# Patient Record
Sex: FEMALE | Race: BLACK OR AFRICAN AMERICAN | Employment: OTHER | URBAN - NONMETROPOLITAN AREA
[De-identification: names, ages, dates, MRNs, and addresses within clinical notes are randomized per-mention and may not be internally consistent; named-entity substitution may affect disease eponyms.]

---

## 2017-09-30 ENCOUNTER — APPOINTMENT (OUTPATIENT)
Dept: GENERAL RADIOLOGY | Age: 62
End: 2017-09-30
Payer: COMMERCIAL

## 2017-09-30 ENCOUNTER — HOSPITAL ENCOUNTER (EMERGENCY)
Age: 62
Discharge: HOME | End: 2017-10-01
Payer: COMMERCIAL

## 2017-09-30 VITALS
OXYGEN SATURATION: 99 % | BODY MASS INDEX: 51.91 KG/M2 | TEMPERATURE: 97.5 F | WEIGHT: 293 LBS | SYSTOLIC BLOOD PRESSURE: 134 MMHG | HEIGHT: 63 IN | DIASTOLIC BLOOD PRESSURE: 77 MMHG | HEART RATE: 92 BPM | RESPIRATION RATE: 19 BRPM

## 2017-09-30 DIAGNOSIS — M94.0 COSTOCHONDRITIS, ACUTE: Primary | ICD-10-CM

## 2017-09-30 LAB
ALBUMIN SERPL-MCNC: 3.6 G/DL (ref 3.5–5.1)
ALP BLD-CCNC: 70 U/L (ref 38–126)
ALT SERPL-CCNC: 16 U/L (ref 11–66)
ANION GAP SERPL CALCULATED.3IONS-SCNC: 13 MEQ/L (ref 8–16)
ANISOCYTOSIS: ABNORMAL
AST SERPL-CCNC: 21 U/L (ref 5–40)
BASOPHILS # BLD: 0.8 %
BASOPHILS ABSOLUTE: 0 THOU/MM3 (ref 0–0.1)
BILIRUB SERPL-MCNC: 0.4 MG/DL (ref 0.3–1.2)
BUN BLDV-MCNC: 15 MG/DL (ref 7–22)
C-REACTIVE PROTEIN: 0.71 MG/DL (ref 0–1)
CALCIUM SERPL-MCNC: 9.5 MG/DL (ref 8.5–10.5)
CHLORIDE BLD-SCNC: 102 MEQ/L (ref 98–111)
CO2: 24 MEQ/L (ref 23–33)
CREAT SERPL-MCNC: 0.8 MG/DL (ref 0.4–1.2)
EKG ATRIAL RATE: 89 BPM
EKG P AXIS: 56 DEGREES
EKG P-R INTERVAL: 176 MS
EKG Q-T INTERVAL: 396 MS
EKG QRS DURATION: 98 MS
EKG QTC CALCULATION (BAZETT): 481 MS
EKG R AXIS: 34 DEGREES
EKG T AXIS: 2 DEGREES
EKG VENTRICULAR RATE: 89 BPM
EOSINOPHIL # BLD: 0.9 %
EOSINOPHILS ABSOLUTE: 0 THOU/MM3 (ref 0–0.4)
GFR SERPL CREATININE-BSD FRML MDRD: 88 ML/MIN/1.73M2
GLUCOSE BLD-MCNC: 132 MG/DL (ref 70–108)
HCT VFR BLD CALC: 37.2 % (ref 37–47)
HEMOGLOBIN: 12 GM/DL (ref 12–16)
HYPOCHROMIA: ABNORMAL
LYMPHOCYTES # BLD: 42.8 %
LYMPHOCYTES ABSOLUTE: 1.8 THOU/MM3 (ref 1–4.8)
MCH RBC QN AUTO: 26.6 PG (ref 27–31)
MCHC RBC AUTO-ENTMCNC: 32.3 GM/DL (ref 33–37)
MCV RBC AUTO: 82.5 FL (ref 81–99)
MONOCYTES # BLD: 9.4 %
MONOCYTES ABSOLUTE: 0.4 THOU/MM3 (ref 0.4–1.3)
NUCLEATED RED BLOOD CELLS: 0 /100 WBC
OSMOLALITY CALCULATION: 280.2 MOSMOL/KG (ref 275–300)
PDW BLD-RTO: 16.1 % (ref 11.5–14.5)
PLATELET # BLD: 253 THOU/MM3 (ref 130–400)
PMV BLD AUTO: 8.8 MCM (ref 7.4–10.4)
POTASSIUM SERPL-SCNC: 3.7 MEQ/L (ref 3.5–5.2)
RBC # BLD: 4.51 MILL/MM3 (ref 4.2–5.4)
RBC # BLD: NORMAL 10*6/UL
SEDIMENTATION RATE, ERYTHROCYTE: 53 MM/HR (ref 0–20)
SEG NEUTROPHILS: 46.1 %
SEGMENTED NEUTROPHILS ABSOLUTE COUNT: 2 THOU/MM3 (ref 1.8–7.7)
SODIUM BLD-SCNC: 139 MEQ/L (ref 135–145)
TOTAL PROTEIN: 7.8 G/DL (ref 6.1–8)
TROPONIN T: < 0.01 NG/ML
WBC # BLD: 4.3 THOU/MM3 (ref 4.8–10.8)

## 2017-09-30 PROCEDURE — 93005 ELECTROCARDIOGRAM TRACING: CPT | Performed by: PHYSICIAN ASSISTANT

## 2017-09-30 PROCEDURE — 86140 C-REACTIVE PROTEIN: CPT

## 2017-09-30 PROCEDURE — 85025 COMPLETE CBC W/AUTO DIFF WBC: CPT

## 2017-09-30 PROCEDURE — 85651 RBC SED RATE NONAUTOMATED: CPT

## 2017-09-30 PROCEDURE — 80053 COMPREHEN METABOLIC PANEL: CPT

## 2017-09-30 PROCEDURE — 71020 XR CHEST STANDARD TWO VW: CPT

## 2017-09-30 PROCEDURE — 96372 THER/PROPH/DIAG INJ SC/IM: CPT

## 2017-09-30 PROCEDURE — 6360000002 HC RX W HCPCS: Performed by: PHYSICIAN ASSISTANT

## 2017-09-30 PROCEDURE — 99284 EMERGENCY DEPT VISIT MOD MDM: CPT

## 2017-09-30 PROCEDURE — 84484 ASSAY OF TROPONIN QUANT: CPT

## 2017-09-30 PROCEDURE — 36415 COLL VENOUS BLD VENIPUNCTURE: CPT

## 2017-09-30 RX ORDER — TRAMADOL HYDROCHLORIDE 50 MG/1
50 TABLET ORAL EVERY 6 HOURS PRN
Qty: 20 TABLET | Refills: 0 | Status: SHIPPED | OUTPATIENT
Start: 2017-09-30 | End: 2017-10-10

## 2017-09-30 RX ORDER — KETOROLAC TROMETHAMINE 30 MG/ML
60 INJECTION, SOLUTION INTRAMUSCULAR; INTRAVENOUS ONCE
Status: COMPLETED | OUTPATIENT
Start: 2017-09-30 | End: 2017-09-30

## 2017-09-30 RX ADMIN — KETOROLAC TROMETHAMINE 60 MG: 30 INJECTION, SOLUTION INTRAMUSCULAR at 21:23

## 2017-09-30 ASSESSMENT — PAIN DESCRIPTION - PAIN TYPE
TYPE: ACUTE PAIN

## 2017-09-30 ASSESSMENT — PAIN SCALES - GENERAL
PAINLEVEL_OUTOF10: 5
PAINLEVEL_OUTOF10: 7
PAINLEVEL_OUTOF10: 7

## 2017-09-30 ASSESSMENT — PAIN DESCRIPTION - FREQUENCY
FREQUENCY: CONTINUOUS
FREQUENCY: CONTINUOUS
FREQUENCY: INTERMITTENT

## 2017-09-30 ASSESSMENT — PAIN DESCRIPTION - LOCATION
LOCATION: OTHER (COMMENT)
LOCATION: RIB CAGE
LOCATION: RIB CAGE

## 2017-09-30 ASSESSMENT — PAIN DESCRIPTION - ORIENTATION
ORIENTATION: LEFT

## 2017-09-30 ASSESSMENT — PAIN DESCRIPTION - DESCRIPTORS: DESCRIPTORS: SHARP

## 2017-09-30 NOTE — ED AVS SNAPSHOT
· A summary of your hospital visit  · Follow-up appointments once you have left the hospital  · Your care plan at home      You may receive a survey regarding the care you received during your stay. Your input is valuable to us. We encourage you to complete and return your survey in the envelope provided. We hope you will choose us in the future for your healthcare needs. Patient Information     Patient Name THUAN Quintana 1955      Care Provided at:     Name Address Phone       [address] [phone]            Your Visit    Here you will find information about your visit, including the reason for your visit. Please take this sheet with you when you visit your doctor or other health care provider in the future. It will help determine the best possible medical care for you at that time. If you have any questions once you leave the hospital, please call the department phone number listed below. Diagnoses this visit     Your diagnosis was COSTOCHONDRITIS, ACUTE. Visit Information     Date of Visit Department Dept Phone    2017 [address] [facility] [address] [address] EMERGENCY DEPT [phone]      You were seen by     You were seen by CORRINA Collier. Follow-up Appointments    Below is a list of your follow-up and future appointments. This may not be a complete list as you may have made appointments directly with providers that we are not aware of or your providers may have made some for you. Please call your providers to confirm appointments. It is important to keep your appointments. Please bring your current insurance card, photo ID, co-pay, and all medication bottles to your appointment. If self-pay, payment is expected at the time of service. Follow-up Information     Follow up with Kartsen Martinez. MYNOR Pang In 3 days.     Specialty:  Nurse Practitioner    Contact information: [facility] [address]  [phone]        Preventive Care        Date Due    Hepatitis C screening is recommended for all adults regardless of risk factors born between [facility] at least once (lifetime) who have never been tested. 1955    HIV screening is recommended for all people regardless of risk factors  aged 15-65 years at least once (lifetime) who have never been HIV tested. 5/23/1972    Tetanus Combination Vaccine (1 - Tdap) 5/23/1976    Pap Smear 5/23/1978    Diabetes Screening 5/23/1997    Colonoscopy 5/23/2007    Cholesterol Screening 10/27/2016    Zoster Vaccine 5/23/2017    Yearly Flu Vaccine (1) 9/1/2017    Mammograms are recommended every 2 years for low/average risk patients aged 48 - 69, and every year for high risk patients per updated national guidelines. However these guidelines can be individualized by your provider. 4/3/2019                 Care Plan Once You Return Home    This section includes instructions you will need to follow once you leave the hospital.  Your care team will discuss these with you, so you and those caring for you know how to best care for your health needs at home. This section may also include educational information about certain health topics that may be of help to you. Important Information if you smoke or are exposed to smoking       SMOKING: QUIT SMOKING. THIS IS THE MOST IMPORTANT ACTION YOU CAN TAKE TO IMPROVE YOUR CURRENT AND FUTURE HEALTH. Call the On license of [facility] [place] NOW ([phone])    Smoking harms nonsmokers. When nonsmokers are around people who smoke, they absorb nicotine, carbon monoxide, and other ingredients of tobacco smoke. DO NOT SMOKE AROUND CHILDREN     Children exposed to secondhand smoke are at an increased risk of:  Sudden Infant Death Syndrome (SIDS), acute respiratory infections, inflammation of the middle ear, and severe asthma.   Over a longer time, it causes heart disease and lung cancer. There is no safe level of exposure to secondhand smoke. MyChart Signup     Our records indicate that you have an active [employer] account. You can view your After Visit Summary by going to [email or web address] and logging in with your [employer] username and password. If you don't have a [employer] username and password but a parent or guardian has access to your record, the parent or guardian should login with their own [employer] username and password and access your record to view the After Visit Summary. Additional Information  If you have questions, please contact the physician practice where you receive care. Remember, [employer] is NOT to be used for urgent needs. For medical emergencies, dial 911. For questions regarding your MyChart account call [phone]. If you have a clinical question, please call your doctor's office. View your information online  ? Review your current list of  medications, immunization, and allergies. ? Review your future test results online . ? Review your discharge instructions provided by your caregivers at discharge    Certain functionality such as prescription refills, scheduling appointments or sending messages to your provider are not activated if your provider does not use [employer] in his/her office    For questions regarding your MyChart account call [phone]. If you have a clinical question, please call your doctor's office. The information on all pages of the After Visit Summary has been reviewed with me, the patient and/or responsible adult, by my health care provider(s). I had the opportunity to ask questions regarding this information. I understand I should dispose of my armband safely at home to protect my health information. A complete copy of the After Visit Summary has been given to me, the patient and/or responsible adult.

## 2017-10-01 ASSESSMENT — ENCOUNTER SYMPTOMS
ABDOMINAL PAIN: 0
VOMITING: 0
COUGH: 0
BACK PAIN: 0
NAUSEA: 0
SORE THROAT: 0
WHEEZING: 0
SHORTNESS OF BREATH: 0
EYE PAIN: 0
RHINORRHEA: 0
EYE DISCHARGE: 0
DIARRHEA: 0

## 2017-10-01 NOTE — ED TRIAGE NOTES
Pt presents to ED complaining of L sided rib pain. Pt states her pain began suddenly earlier today and about 1 hour ago her pain became immensely worse. Pt states she could barely move so she called her son to bring her in. Pt denies taking anything for pain. Will monitor.

## 2017-10-01 NOTE — ED PROVIDER NOTES
[facility]  eMERGENCY dEPARTMENT eNCOUnter          CHIEF COMPLAINT       Chief Complaint   Patient presents with    Rib Pain       Nurses Notes reviewed and I agree except as noted in the HPI. HISTORY OF PRESENT ILLNESS    Lio Naqvi is a 63 y.o. female who presents to the Emergency Department for the evaluation of left-sided rib pain. Patient states that the pain began suddenly today and got worse while she was sitting down at roughly 8:00pm. She rates her pain at a 7/10 in severity, and states that the pain is sharp in quality. She states that the pain is continuous in duration, and that the pain gets worse with movement and when taking deep breaths. She states that the pain radiates to her back. She denies any injury, chest pain, shortness of breath, or abdominal pain. She reports a history of diabetes. Patient denies further complaints at initial encounter. Location/Symptom: Left-sided rib pain  Timing/Onset: Today, but worsened at roughly 8:00pm  Context/Setting: Patient states that the pain began suddenly and denies any injury. Quality: Sharp  Duration: Continuous  Modifying Factors: Movement and taking deep breaths worsen the pain. Severity: 7/10    The HPI was provided by the patient. REVIEW OF SYSTEMS     Review of Systems   Constitutional: Negative for appetite change, chills, fatigue and fever. HENT: Negative for congestion, ear pain, rhinorrhea and sore throat. Eyes: Negative for pain, discharge and visual disturbance. Respiratory: Negative for cough, shortness of breath and wheezing. Cardiovascular: Negative for chest pain, palpitations and leg swelling. Gastrointestinal: Negative for abdominal pain, diarrhea, nausea and vomiting. Genitourinary: Negative for difficulty urinating, dysuria and vaginal discharge. Musculoskeletal: Positive for arthralgias (left-sided rib pain). Negative for back pain, joint swelling and neck pain.    Skin: Negative for pallor and rash. Neurological: Negative for dizziness, syncope, weakness, light-headedness and headaches. Hematological: Negative for adenopathy. Psychiatric/Behavioral: Negative for confusion, dysphoric mood and suicidal ideas. The patient is not nervous/anxious. PAST MEDICAL HISTORY    has a past medical history of Anxiety; Brain tumor ([address] Diabetes mellitus ([facility] [address] Obesity; and Seizures ([facility] [address] SURGICAL HISTORY      has a past surgical history that includes  section; Appendectomy; and Brain tumor excision. CURRENT MEDICATIONS       Previous Medications    ATORVASTATIN (LIPITOR) 10 MG TABLET    Take 10 mg by mouth daily. HYDROCODONE-ACETAMINOPHEN (NORCO) 5-325 MG PER TABLET    Take 1 tablet by mouth every 4 hours as needed for Pain. METFORMIN (GLUCOPHAGE) 500 MG TABLET    Take 500 mg by mouth daily. TRIAMCINOLONE (KENALOG) 0.1 % CREAM    Apply topically 2 times daily. ALLERGIES     has No Known Allergies. FAMILY HISTORY     indicated that her mother is . She indicated that her father is . She indicated that her sister is . family history includes Cancer in her mother and sister; Stroke in her father. SOCIAL HISTORY      reports that she has never smoked. She does not have any smokeless tobacco history on file. She reports that she does not drink alcohol or use illicit drugs. PHYSICAL EXAM     INITIAL VITALS:  height is 5' 3\" (1.6 m) and weight is 349 lb (158.3 kg) (abnormal). Her oral temperature is 97.5 °F (36.4 °C). Her blood pressure is 134/77 and her pulse is 92. Her respiration is 19 and oxygen saturation is 99%. Physical Exam   Constitutional: She is oriented to person, place, and time. Vital signs are normal. She appears well-developed and well-nourished. She is active and cooperative. HENT:   Head: Normocephalic and atraumatic.    Right Ear: External ear normal.   Left Ear: External ear normal.   Eyes: Conjunctivae and EOM are normal. Right eye exhibits no discharge. Left eye exhibits no discharge. No scleral icterus. Neck: Trachea normal and normal range of motion. Neck supple. No JVD present. No tracheal deviation present. Cardiovascular: Normal rate, regular rhythm, normal heart sounds, intact distal pulses and normal pulses. Exam reveals no gallop and no friction rub. No murmur heard. Pulmonary/Chest: Effort normal and breath sounds normal. No accessory muscle usage. No respiratory distress. She has no decreased breath sounds. She has no wheezes. She has no rhonchi. She has no rales. She exhibits tenderness (left-sided). Patient exhibits left-sided anterior chest wall tenderness which radiates to her back and gets exacerbated with movement. Abdominal: Soft. She exhibits no distension. There is no tenderness. There is no rebound and no guarding. Musculoskeletal: Normal range of motion. She exhibits no edema. Neurological: She is alert and oriented to person, place, and time. No cranial nerve deficit or sensory deficit. She exhibits normal muscle tone. She displays no seizure activity. GCS eye subscore is 4. GCS verbal subscore is 5. GCS motor subscore is 6. Skin: Skin is warm and dry. No rash noted. She is not diaphoretic. Psychiatric: She has a normal mood and affect. Her speech is normal and behavior is normal. Thought content normal.   Nursing note and vitals reviewed. DIFFERENTIAL DIAGNOSIS:   Chest wall strain, costochondritis, left-sided rib fracture. DIAGNOSTIC RESULTS     EKG: All EKG's are interpreted by the Emergency Department Physician who either signs or Co-signs this chart in the absence of a cardiologist.    EKG read and interpreted by myself gives impression of normal sinus rhythm with heart rate of 89; interval 176; QRS 98;QTc 481; axis 56 34 2. No STEMI.      RADIOLOGY: non-plain film images(s) such as CT, Ultrasound and MRI are read by the radiologist.    XR CHEST STANDARD (2 VW)   Final Result    Stable radiographic appearance of the chest. No evidence of an acute process. **This report has been created using voice recognition software. It may contain minor errors which are inherent in voice recognition technology. **      Final report electronically signed by Dr. Earl Billingsley on 9/30/2017 10:51 PM          LABS:     Labs Reviewed   CBC WITH AUTO DIFFERENTIAL - Abnormal; Notable for the following:        Result Value    WBC 4.3 (*)     MCH 26.6 (*)     MCHC 32.3 (*)     RDW 16.1 (*)     All other components within normal limits   COMPREHENSIVE METABOLIC PANEL - Abnormal; Notable for the following:     Glucose 132 (*)     All other components within normal limits   SEDIMENTATION RATE - Abnormal; Notable for the following:     Sed Rate 53 (*)     All other components within normal limits   GLOMERULAR FILTRATION RATE, ESTIMATED - Abnormal; Notable for the following:     Est, Glom Filt Rate 88 (*)     All other components within normal limits   TROPONIN   C-REACTIVE PROTEIN   ANION GAP   OSMOLALITY       EMERGENCY DEPARTMENT COURSE:   Vitals:    Vitals:    09/30/17 2101 09/30/17 2238 09/30/17 2347   BP: (!) 156/90 137/78 134/77   Pulse: 94 85 92   Resp: 18 19 19   Temp: 97.5 °F (36.4 °C)     TempSrc: Oral     SpO2: 100% 100% 99%   Weight: (!) 349 lb (158.3 kg)     Height: 5' 3\" (1.6 m)         11:48 PM: The patient was seen and evaluated. MDM:  The patient was seen and evaluated within the ED today with left-sided rib pain. Within the department, I observed the patient's vital signs to be within acceptable range. On exam, I appreciated left-sided anterior chest wall tenderness which radiated to the patient's back. Radiological studies within the department revealed no acute findings. Laboratory work was reassuring. Within the department, the patient was treated with Toradol for pain. I observed the patient's condition to improve during the duration of their stay.  I explained my proposed course of treatment to the patient, who was amenable to my decision. They were discharged home in stable condition with prescriptions for Ultram, and the patient will return to the ED if their symptoms become more severe in nature or otherwise change. CRITICAL CARE:   None     CONSULTS:  None    PROCEDURES:  None    FINAL IMPRESSION      1. Costochondritis, acute          DISPOSITION/PLAN   Discharge    PATIENT REFERRED TO:  Cecil Mcpherson, CNP  [address]  [phone]    In 3 days        DISCHARGE MEDICATIONS:  New Prescriptions    TRAMADOL (ULTRAM) 50 MG TABLET    Take 1 tablet by mouth every 6 hours as needed for Pain       (Please note that portions of this note were completed with a voice recognition program.  Efforts were made to edit the dictations but occasionally words are mis-transcribed.)    The patient was given an opportunity to see the Emergency Attending. The patient voiced understanding that I was a Mid-Level Provider and was in agreement with being seen independently by myself. Scribe:  Anjana Rosario 9/30/17 11:48 PM Scribing for and in the presence of Janiya Hogan PA-C. Signed by: Max Kirkland, 09/30/17 11:48 PM    Provider:  I personally performed the services described in the documentation, reviewed and edited the documentation which was dictated to the scribe in my presence, and it accurately records my words and actions.     Janiya Hogan PA-C 9/30/17 11:48 PM       CORRINA Bell  10/01/17 [id number]

## 2019-06-07 ENCOUNTER — HOSPITAL ENCOUNTER (OUTPATIENT)
Dept: WOMENS IMAGING | Age: 64
Discharge: HOME | End: 2019-06-07
Payer: COMMERCIAL

## 2019-06-07 DIAGNOSIS — Z13.9 VISIT FOR SCREENING: ICD-10-CM

## 2019-06-07 PROCEDURE — 77063 BREAST TOMOSYNTHESIS BI: CPT

## 2021-05-19 ENCOUNTER — HOSPITAL ENCOUNTER (OUTPATIENT)
Dept: WOMENS IMAGING | Age: 66
Discharge: HOME | End: 2021-05-19
Payer: COMMERCIAL

## 2021-05-19 DIAGNOSIS — Z12.31 VISIT FOR SCREENING MAMMOGRAM: ICD-10-CM

## 2021-05-19 PROCEDURE — 77063 BREAST TOMOSYNTHESIS BI: CPT

## 2021-08-24 ENCOUNTER — HOSPITAL ENCOUNTER (EMERGENCY)
Age: 66
Discharge: HOME | End: 2021-08-24
Payer: COMMERCIAL

## 2021-08-24 VITALS
TEMPERATURE: 98.2 F | WEIGHT: 293 LBS | DIASTOLIC BLOOD PRESSURE: 98 MMHG | HEART RATE: 103 BPM | OXYGEN SATURATION: 100 % | SYSTOLIC BLOOD PRESSURE: 150 MMHG | HEIGHT: 63 IN | BODY MASS INDEX: 51.91 KG/M2 | RESPIRATION RATE: 18 BRPM

## 2021-08-24 DIAGNOSIS — T78.3XXA ANGIOEDEMA, INITIAL ENCOUNTER: Primary | ICD-10-CM

## 2021-08-24 PROCEDURE — 99282 EMERGENCY DEPT VISIT SF MDM: CPT

## 2021-08-24 RX ORDER — PREDNISONE 10 MG/1
TABLET ORAL
Qty: 20 TABLET | Refills: 0 | Status: SHIPPED | OUTPATIENT
Start: 2021-08-24 | End: 2021-09-03

## 2021-08-24 RX ORDER — FAMOTIDINE 20 MG/1
20 TABLET, FILM COATED ORAL 2 TIMES DAILY
Qty: 60 TABLET | Refills: 3 | Status: ON HOLD | OUTPATIENT
Start: 2021-08-24 | End: 2021-08-25 | Stop reason: ALTCHOICE

## 2021-08-24 RX ORDER — HYDROXYZINE 50 MG/1
50 TABLET, FILM COATED ORAL 3 TIMES DAILY PRN
Qty: 30 TABLET | Refills: 0 | Status: ON HOLD | OUTPATIENT
Start: 2021-08-24 | End: 2021-08-25 | Stop reason: ALTCHOICE

## 2021-08-24 NOTE — ED PROVIDER NOTES
Reason for Visit: Facial Swelling      HISTORY OF PRESENT ILLNESS       HPI: This 61 y.o. femalepresents to the emergency department for facial swelling. She reports that around 1:30 pm today while she was at [employer] her upper lip suddenly started to swell. She denies having any injury to the area. SHe denies hx of allergies. She denies using any new soaps, lotions, creams, or detergents. She denies being bit by an insect in the area or eating any new foods. She reports that the area is not painful. She denies throat swelling or difficulty breathing. Patient has never had anything like this happen to her before. Patient is currently on lisinopril. Past Medical History:   Diagnosis Date    Anxiety     [facility] ([facility] [address]     X2; benign    Diabetes mellitus ([facility] [address]     Obesity     Seizures ([facility] [address]        Past Surgical History:   Procedure Laterality Date    APPENDECTOMY      BRAIN TUMOR EXCISION      x 2     SECTION      x3       Mohit Fuentes   Social History     Socioeconomic History    Marital status:      Spouse name: Not on file    Number of children: Not on file    Years of education: Not on file    Highest education level: Not on file   Occupational History    Not on file   Tobacco Use    Smoking status: Never Smoker    Smokeless tobacco: Never Used   Vaping Use    Vaping Use: Never used   Substance and Sexual Activity    Alcohol use: Yes     Comment: occasion    Drug use: No    Sexual activity: Not on file   Other Topics Concern    Not on file   Social History Narrative    Not on file     Social Determinants of Health     Financial Resource Strain:     Difficulty of Paying Living Expenses:    Food Insecurity:     Worried About Running Out of Food in the Last Year:     [address] in the Last Year:    Transportation Needs:     Lack of Transportation (Medical):      Lack of Transportation (Non-Medical):    Physical Activity:     Days of Exercise per Week:     Minutes of Exercise per Session:    Stress:     Feeling of Stress :    Social Connections:     Frequency of Communication with Friends and Family:     Frequency of Social Gatherings with Friends and Family:     Attends Religion Services:     Active Member of Clubs or Organizations:     Attends Club or Organization Meetings:     Marital Status:    Intimate Partner Violence:     Fear of Current or Ex-Partner:     Emotionally Abused:     Physically Abused:     Sexually Abused:        Discharge Medication List as of 8/24/2021  3:37 PM      CONTINUE these medications which have NOT CHANGED    Details   atorvastatin (LIPITOR) 10 MG tablet Take 10 mg by mouth daily. Historical Med      HYDROcodone-acetaminophen (NORCO) 5-325 MG per tablet Take 1 tablet by mouth every 4 hours as needed for Pain., Disp-20 tablet, R-0Print      metformin (GLUCOPHAGE) 500 MG tablet Take 500 mg by mouth daily. Historical Med      triamcinolone (KENALOG) 0.1 % cream Apply topically 2 times daily. , Disp-45 g, R-0, Print             Allergies: Allergies as of 08/24/2021    (No Known Allergies)       Review of Systems       See HPI for further details. At least 10 systems reviewed and are otherwise negative unless noted in the history of present illness. Constitutional: Denies fever or chills   Eyes: Denies change in visual acuity   HENT: Upper lip swelling.  Denies nasal congestion or sore throat   Respiratory: Denies cough or shortness of breath   Cardiovascular: Denies chest pain or edema   GI: Denies abdominal pain, nausea, vomiting, bloody stools or diarrhea   Musculoskeletal: Denies back pain or joint pain   Integument: Denies rash   Neurologic: Denies headache, focal weakness or sensory changes    Psychiatric: Denies depression or anxiety       Physical Exam       Vitals:    08/24/21 1408   BP: (!) 150/98   Pulse: 103   Resp: 18   Temp: 98.2 °F (36.8 °C)   TempSrc: Oral   SpO2: 100%   Weight: (!) 378 lb (171.5 kg)   Height: 5' 3\" (1.6 m)       Constitutional: No acute distress, Non-toxic appearance; well nourished    HENT: Normocephalic, Atraumatic, Swelling noted to the upper lip that extends to the right side of the face. Bilateral external ears normal, Oropharynx moist, No swelling of the oropharynx noted. No oral exudates, Nose normal.    Eyes: PERRLA, EOMI, Conjunctiva normal, No discharge. Neck: Normal range of motion, No tenderness, Supple, No lymphadenopathy, No stridor. Cardiovascular: Normal heart rate, Normal rhythm, No murmurs, No rubs, No gallops. Pulmonary/Chest: Normal breath sounds, No respiratory distress, No wheezing, No chest tenderness    Abdomen: Bowel sounds normal, Soft, No tenderness, No masses, No pulsatile masses    Extremities: Normal range of motion, No edema, Patient is able to walk bear weight. No ecchymosis or erythema. Neurologic: Alert & oriented x 3,     Skin: Warm, Dry, No erythema, No rash    Psychiatric: Alert and oriented to person, place, and time. Patient maintains good eye contact. Mood and affect were normal. Concentration appeared normal      Diagnostic Studies       Please see electronic medical record for any tests performed in the ED. Labs:    Labs Reviewed - No data to display    Radiology:    No orders to display       Emergency Department Procedures         ED Course and MDM       Em Pickering is a 61 y.o. female who presented to the emergency department with a chief complaint of facial swelling. Patient was seen, history and physical exam was performed. Patient remains stable here in the emergency department. Patient's physical examination findings were reviewed and were concerning for angioedema. No tongue swelling no difficulty swallowing. No shortness of breath no headache. Angioedema seems to be localized.     Labs Reviewed - No data to display  Medications - No data to display  Discharge Medication List as of 8/24/2021  3:37 PM      START taking these medications    Details   predniSONE (DELTASONE) 10 MG tablet Take 4 tablets by mouth once daily for 5 days, Disp-20 tablet, R-0Print      hydrOXYzine (ATARAX) 50 MG tablet Take 1 tablet by mouth 3 times daily as needed for Itching, Disp-30 tablet, R-0Print      famotidine (PEPCID) 20 MG tablet Take 1 tablet by mouth 2 times daily, Disp-60 tablet, R-3Print               Counseling     The emergency provider has spoken with the patient and discussed today's findings, in addition to providing specific details for the plan of care. Questions are answered and there is agreement with the plan. Patient was given clear discharge and followup instructions including return to the ER immediately for worsening concerns. Patient is advised to followup with primary care physician and/or referred physician in the next one to two days or sooner if worsening and to return to the ER immediately as above with any concerns. Usual and customary treatment and medication side effects and warning signs discussed. Patient was discharged from emergency department in good condition with all questions answered and patient has demonstrated capacity to understand these instructions and to follow up. Refer to the patient's discharge summary for more information on plan and follow-up. I have explained to the patient in appropriate terminology our work-up in the ED and their diagnosis. I have also given anticipatory guidance and expectant management of their condition as an outpatient as per my custom. They are instructed to return to the ED if their condition deteriorates in any way    Of note, this patient's blood pressure was elevated here in the department however they are asymptomatic at this time. Patient educated on how to check blood pressure at home and urged to closely follow up with a primary care provider for their blood pressure.      This patient was seen under the direct supervision of the attending physician who was available for consultation. Differential Diagnosis   Allergic reaction, angioedema, bite/sting    Consults: None     DECISION to ADMIT / DISCHARGE:     7:29 PM    Clinical Impression       1.   1. Angioedema, initial encounter        Disposition       All results were shared with the patient, medical decision making was discussed and all questions were answered, and she agreed to assessment and plan. Patient was DISCHARGED from the hospital. Based on the reassuring ED workup and patient's stable vital signs, I feel the patient may be safely discharged home. At this point in time, I believe the patient has the mental capacity to make medical decisions.           Liza Armendariz, [id number]  08/24/21 1929

## 2021-08-24 NOTE — ED TRIAGE NOTES
Patient presents to ER today due to facial swelling. She states it came on while she was at [facility], around 1pm today. Denies allergies, denies difficulty breathing. No new medications, soaps, laundry products.

## 2021-08-25 ENCOUNTER — HOSPITAL ENCOUNTER (OUTPATIENT)
Age: 66
Setting detail: OBSERVATION
Discharge: HOME | End: 2021-08-26
Attending: INTERNAL MEDICINE | Admitting: INTERNAL MEDICINE
Payer: COMMERCIAL

## 2021-08-25 DIAGNOSIS — T78.3XXA ANGIOEDEMA, INITIAL ENCOUNTER: Primary | ICD-10-CM

## 2021-08-25 LAB
ABO: NORMAL
ALBUMIN SERPL-MCNC: 4.2 G/DL (ref 3.5–5.1)
ALP BLD-CCNC: 93 U/L (ref 38–126)
ALT SERPL-CCNC: 15 U/L (ref 11–66)
ANION GAP SERPL CALCULATED.3IONS-SCNC: 11 MEQ/L (ref 8–16)
ANTIBODY SCREEN: NORMAL
AST SERPL-CCNC: 21 U/L (ref 5–40)
BASOPHILS # BLD: 0.4 %
BASOPHILS ABSOLUTE: 0 THOU/MM3 (ref 0–0.1)
BILIRUB SERPL-MCNC: 0.5 MG/DL (ref 0.3–1.2)
BUN BLDV-MCNC: 14 MG/DL (ref 7–22)
CALCIUM SERPL-MCNC: 9.8 MG/DL (ref 8.5–10.5)
CHLORIDE BLD-SCNC: 107 MEQ/L (ref 98–111)
CO2: 21 MEQ/L (ref 23–33)
CREAT SERPL-MCNC: 0.9 MG/DL (ref 0.4–1.2)
EKG ATRIAL RATE: 91 BPM
EKG P AXIS: 70 DEGREES
EKG P-R INTERVAL: 188 MS
EKG Q-T INTERVAL: 380 MS
EKG QRS DURATION: 98 MS
EKG QTC CALCULATION (BAZETT): 467 MS
EKG R AXIS: 61 DEGREES
EKG T AXIS: 28 DEGREES
EKG VENTRICULAR RATE: 91 BPM
EOSINOPHIL # BLD: 0.2 %
EOSINOPHILS ABSOLUTE: 0 THOU/MM3 (ref 0–0.4)
ERYTHROCYTE [DISTWIDTH] IN BLOOD BY AUTOMATED COUNT: 16.9 % (ref 11.5–14.5)
ERYTHROCYTE [DISTWIDTH] IN BLOOD BY AUTOMATED COUNT: 51.8 FL (ref 35–45)
GFR SERPL CREATININE-BSD FRML MDRD: 76 ML/MIN/1.73M2
GLUCOSE BLD-MCNC: 106 MG/DL (ref 70–108)
GLUCOSE BLD-MCNC: 127 MG/DL (ref 70–108)
HCT VFR BLD CALC: 38.1 % (ref 37–47)
HEMOGLOBIN: 12.3 GM/DL (ref 12–16)
IMMATURE GRANS (ABS): 0.04 THOU/MM3 (ref 0–0.07)
IMMATURE GRANULOCYTES: 0.9 %
LYMPHOCYTES # BLD: 35.6 %
LYMPHOCYTES ABSOLUTE: 1.6 THOU/MM3 (ref 1–4.8)
MCH RBC QN AUTO: 27.2 PG (ref 26–33)
MCHC RBC AUTO-ENTMCNC: 32.3 GM/DL (ref 32.2–35.5)
MCV RBC AUTO: 84.1 FL (ref 81–99)
MONOCYTES # BLD: 6.6 %
MONOCYTES ABSOLUTE: 0.3 THOU/MM3 (ref 0.4–1.3)
NUCLEATED RED BLOOD CELLS: 0 /100 WBC
OSMOLALITY CALCULATION: 278.4 MOSMOL/KG (ref 275–300)
PLATELET # BLD: 249 THOU/MM3 (ref 130–400)
PMV BLD AUTO: 10.5 FL (ref 9.4–12.4)
POTASSIUM REFLEX MAGNESIUM: 4.3 MEQ/L (ref 3.5–5.2)
PROCALCITONIN: 0.04 NG/ML (ref 0.01–0.09)
RBC # BLD: 4.53 MILL/MM3 (ref 4.2–5.4)
RH FACTOR: NORMAL
SEG NEUTROPHILS: 56.3 %
SEGMENTED NEUTROPHILS ABSOLUTE COUNT: 2.6 THOU/MM3 (ref 1.8–7.7)
SODIUM BLD-SCNC: 139 MEQ/L (ref 135–145)
TOTAL PROTEIN: 8.3 G/DL (ref 6.1–8)
WBC # BLD: 4.6 THOU/MM3 (ref 4.8–10.8)

## 2021-08-25 PROCEDURE — 84145 PROCALCITONIN (PCT): CPT

## 2021-08-25 PROCEDURE — 36415 COLL VENOUS BLD VENIPUNCTURE: CPT

## 2021-08-25 PROCEDURE — 86901 BLOOD TYPING SEROLOGIC RH(D): CPT

## 2021-08-25 PROCEDURE — 96374 THER/PROPH/DIAG INJ IV PUSH: CPT

## 2021-08-25 PROCEDURE — 85025 COMPLETE CBC W/AUTO DIFF WBC: CPT

## 2021-08-25 PROCEDURE — 96376 TX/PRO/DX INJ SAME DRUG ADON: CPT

## 2021-08-25 PROCEDURE — P9017 PLASMA 1 DONOR FRZ W/IN 8 HR: HCPCS

## 2021-08-25 PROCEDURE — 6360000002 HC RX W HCPCS

## 2021-08-25 PROCEDURE — 6370000000 HC RX 637 (ALT 250 FOR IP)

## 2021-08-25 PROCEDURE — 96375 TX/PRO/DX INJ NEW DRUG ADDON: CPT

## 2021-08-25 PROCEDURE — G0378 HOSPITAL OBSERVATION PER HR: HCPCS

## 2021-08-25 PROCEDURE — 82948 REAGENT STRIP/BLOOD GLUCOSE: CPT

## 2021-08-25 PROCEDURE — 96365 THER/PROPH/DIAG IV INF INIT: CPT

## 2021-08-25 PROCEDURE — 2580000003 HC RX 258

## 2021-08-25 PROCEDURE — 86850 RBC ANTIBODY SCREEN: CPT

## 2021-08-25 PROCEDURE — 93005 ELECTROCARDIOGRAM TRACING: CPT

## 2021-08-25 PROCEDURE — 2500000003 HC RX 250 WO HCPCS: Performed by: INTERNAL MEDICINE

## 2021-08-25 PROCEDURE — 80053 COMPREHEN METABOLIC PANEL: CPT

## 2021-08-25 PROCEDURE — 36430 TRANSFUSION BLD/BLD COMPNT: CPT

## 2021-08-25 PROCEDURE — 99220 PR INITIAL OBSERVATION CARE/DAY 70 MINUTES: CPT | Performed by: INTERNAL MEDICINE

## 2021-08-25 PROCEDURE — 86900 BLOOD TYPING SEROLOGIC ABO: CPT

## 2021-08-25 PROCEDURE — 93010 ELECTROCARDIOGRAM REPORT: CPT | Performed by: INTERNAL MEDICINE

## 2021-08-25 PROCEDURE — 6360000002 HC RX W HCPCS: Performed by: INTERNAL MEDICINE

## 2021-08-25 PROCEDURE — 96372 THER/PROPH/DIAG INJ SC/IM: CPT

## 2021-08-25 PROCEDURE — 99284 EMERGENCY DEPT VISIT MOD MDM: CPT

## 2021-08-25 RX ORDER — ASPIRIN 81 MG/1
81 TABLET ORAL DAILY
COMMUNITY

## 2021-08-25 RX ORDER — ONDANSETRON 2 MG/ML
4 INJECTION INTRAMUSCULAR; INTRAVENOUS EVERY 6 HOURS PRN
Status: DISCONTINUED | OUTPATIENT
Start: 2021-08-25 | End: 2021-08-26 | Stop reason: HOSPADM

## 2021-08-25 RX ORDER — ACETAMINOPHEN 650 MG/1
650 SUPPOSITORY RECTAL EVERY 6 HOURS PRN
Status: DISCONTINUED | OUTPATIENT
Start: 2021-08-25 | End: 2021-08-26 | Stop reason: HOSPADM

## 2021-08-25 RX ORDER — FUROSEMIDE 40 MG/1
40 TABLET ORAL DAILY
COMMUNITY

## 2021-08-25 RX ORDER — METHYLPREDNISOLONE SODIUM SUCCINATE 40 MG/ML
40 INJECTION, POWDER, LYOPHILIZED, FOR SOLUTION INTRAMUSCULAR; INTRAVENOUS ONCE
Status: COMPLETED | OUTPATIENT
Start: 2021-08-26 | End: 2021-08-26

## 2021-08-25 RX ORDER — POLYETHYLENE GLYCOL 3350 17 G/17G
17 POWDER, FOR SOLUTION ORAL DAILY PRN
Status: DISCONTINUED | OUTPATIENT
Start: 2021-08-25 | End: 2021-08-26 | Stop reason: HOSPADM

## 2021-08-25 RX ORDER — SODIUM CHLORIDE 0.9 % (FLUSH) 0.9 %
5-40 SYRINGE (ML) INJECTION PRN
Status: DISCONTINUED | OUTPATIENT
Start: 2021-08-25 | End: 2021-08-26 | Stop reason: HOSPADM

## 2021-08-25 RX ORDER — SODIUM CHLORIDE 0.9 % (FLUSH) 0.9 %
5-40 SYRINGE (ML) INJECTION EVERY 12 HOURS SCHEDULED
Status: DISCONTINUED | OUTPATIENT
Start: 2021-08-25 | End: 2021-08-26 | Stop reason: HOSPADM

## 2021-08-25 RX ORDER — AMLODIPINE BESYLATE 10 MG/1
10 TABLET ORAL DAILY
Status: DISCONTINUED | OUTPATIENT
Start: 2021-08-25 | End: 2021-08-26 | Stop reason: HOSPADM

## 2021-08-25 RX ORDER — ISOSORBIDE MONONITRATE 30 MG/1
30 TABLET, EXTENDED RELEASE ORAL DAILY
Status: DISCONTINUED | OUTPATIENT
Start: 2021-08-25 | End: 2021-08-26 | Stop reason: HOSPADM

## 2021-08-25 RX ORDER — DIPHENHYDRAMINE HYDROCHLORIDE 50 MG/ML
25 INJECTION INTRAMUSCULAR; INTRAVENOUS ONCE
Status: COMPLETED | OUTPATIENT
Start: 2021-08-25 | End: 2021-08-25

## 2021-08-25 RX ORDER — ASPIRIN 81 MG/1
81 TABLET ORAL DAILY
Status: DISCONTINUED | OUTPATIENT
Start: 2021-08-25 | End: 2021-08-26 | Stop reason: HOSPADM

## 2021-08-25 RX ORDER — SODIUM CHLORIDE 9 MG/ML
INJECTION, SOLUTION INTRAVENOUS PRN
Status: DISCONTINUED | OUTPATIENT
Start: 2021-08-25 | End: 2021-08-26 | Stop reason: HOSPADM

## 2021-08-25 RX ORDER — SODIUM CHLORIDE 9 MG/ML
25 INJECTION, SOLUTION INTRAVENOUS PRN
Status: DISCONTINUED | OUTPATIENT
Start: 2021-08-25 | End: 2021-08-26 | Stop reason: HOSPADM

## 2021-08-25 RX ORDER — AMLODIPINE BESYLATE 10 MG/1
10 TABLET ORAL DAILY
COMMUNITY

## 2021-08-25 RX ORDER — DIPHENHYDRAMINE HYDROCHLORIDE 50 MG/ML
25 INJECTION INTRAMUSCULAR; INTRAVENOUS EVERY 6 HOURS
Status: DISCONTINUED | OUTPATIENT
Start: 2021-08-25 | End: 2021-08-26 | Stop reason: HOSPADM

## 2021-08-25 RX ORDER — ISOSORBIDE MONONITRATE 30 MG/1
30 TABLET, EXTENDED RELEASE ORAL DAILY
COMMUNITY

## 2021-08-25 RX ORDER — FUROSEMIDE 40 MG/1
40 TABLET ORAL DAILY
Status: DISCONTINUED | OUTPATIENT
Start: 2021-08-25 | End: 2021-08-26 | Stop reason: HOSPADM

## 2021-08-25 RX ORDER — METHYLPREDNISOLONE SODIUM SUCCINATE 125 MG/2ML
125 INJECTION, POWDER, LYOPHILIZED, FOR SOLUTION INTRAMUSCULAR; INTRAVENOUS ONCE
Status: COMPLETED | OUTPATIENT
Start: 2021-08-25 | End: 2021-08-25

## 2021-08-25 RX ORDER — ATORVASTATIN CALCIUM 10 MG/1
10 TABLET, FILM COATED ORAL DAILY
Status: DISCONTINUED | OUTPATIENT
Start: 2021-08-25 | End: 2021-08-26 | Stop reason: HOSPADM

## 2021-08-25 RX ORDER — ACETAMINOPHEN 325 MG/1
650 TABLET ORAL EVERY 6 HOURS PRN
Status: DISCONTINUED | OUTPATIENT
Start: 2021-08-25 | End: 2021-08-26 | Stop reason: HOSPADM

## 2021-08-25 RX ORDER — ONDANSETRON 4 MG/1
4 TABLET, ORALLY DISINTEGRATING ORAL EVERY 8 HOURS PRN
Status: DISCONTINUED | OUTPATIENT
Start: 2021-08-25 | End: 2021-08-26 | Stop reason: HOSPADM

## 2021-08-25 RX ADMIN — ISOSORBIDE MONONITRATE 30 MG: 30 TABLET ORAL at 22:07

## 2021-08-25 RX ADMIN — DIPHENHYDRAMINE HYDROCHLORIDE 25 MG: 50 INJECTION, SOLUTION INTRAMUSCULAR; INTRAVENOUS at 10:37

## 2021-08-25 RX ADMIN — AMLODIPINE BESYLATE 10 MG: 10 TABLET ORAL at 22:05

## 2021-08-25 RX ADMIN — ATORVASTATIN CALCIUM 10 MG: 10 TABLET, FILM COATED ORAL at 22:06

## 2021-08-25 RX ADMIN — METFORMIN HYDROCHLORIDE 500 MG: 500 TABLET ORAL at 22:08

## 2021-08-25 RX ADMIN — FUROSEMIDE 40 MG: 40 TABLET ORAL at 22:09

## 2021-08-25 RX ADMIN — DIPHENHYDRAMINE HYDROCHLORIDE 25 MG: 50 INJECTION, SOLUTION INTRAMUSCULAR; INTRAVENOUS at 19:22

## 2021-08-25 RX ADMIN — ENOXAPARIN SODIUM 40 MG: 40 INJECTION SUBCUTANEOUS at 22:09

## 2021-08-25 RX ADMIN — SODIUM CHLORIDE 1500 MG: 900 INJECTION INTRAVENOUS at 19:22

## 2021-08-25 RX ADMIN — ASPIRIN 81 MG: 81 TABLET, COATED ORAL at 22:09

## 2021-08-25 RX ADMIN — FAMOTIDINE 20 MG: 10 INJECTION, SOLUTION INTRAVENOUS at 10:37

## 2021-08-25 RX ADMIN — METHYLPREDNISOLONE SODIUM SUCCINATE 125 MG: 125 INJECTION, POWDER, FOR SOLUTION INTRAMUSCULAR; INTRAVENOUS at 10:37

## 2021-08-25 ASSESSMENT — PAIN SCALES - GENERAL
PAINLEVEL_OUTOF10: 0
PAINLEVEL_OUTOF10: 0

## 2021-08-25 NOTE — ED NOTES
Pt appears to be comfortable. No apparent distress noted, respirations are equal and unlabored.  Lung sounds remain clear no signs of transfusion reaction at this time     Mae Johnson RN  08/25/21 4311

## 2021-08-25 NOTE — ED NOTES
Pt appears to be comfortable, resting on cot. Pt is alert and oriented, respirations are equal and unlabored, pt rates their pain 0/10. Unsuccessful in obtaining blood specimens at this time. Waiting for lab to attempt.  Pt denies further needs at this time, will continue to monitor        Paloma Crowder RN  08/25/21 6340

## 2021-08-25 NOTE — PROGRESS NOTES
Pt admitted to  5K21 via via cart/stretcher from ED. Complaints: swelling in lips. IV site free of s/s of infection or infiltration. Vital signs obtained. Assessment and data collection initiated. Two nurse skin assessment performed by Ciera Sánchez and Dharmesh Hampton. Oriented to room. Policies and procedures for 5K explained. All questions answered with no further questions at this time. Fall prevention and safety brochure discussed with patient. Bed alarm on. Call light in reach. Oriented to room. Jojo Haas, RN, RN 8/25/2021 6:27 PM     Explained patients right to have family, representative or physician notified of their admission. Patient has Requested for physician to be notified. Patient has Declined for family/representative to be notified.

## 2021-08-25 NOTE — H&P
History & Physical        Patient:  Jessy Meyer  YOB: 1955    MRN: [id number]     Acct: [de-identified]     PCP: Leatha Toth. YONY Orellana - CNP    Date of Admission: 8/25/2021    Date of Service: Pt seen/examined on 08/25/21  and Admitted to Inpatient with expected LOS less than two midnights due to medical therapy. PLAN:  1. Angioedema of lips - most likely medication induced- lisinopril.  -Hx of lisinopril use, sudden onset, localized edema- mostly lower lips  -hold possible cause of angioedema- lisinopril  -benadryl 25 mg   -monitoring lab results such as cbc, procal    2. Plegmon of submandibular regions?-less likely. -denture use- possible cause of trauma  -local edema, swollen submandibular lymph nodes on left side  -antibiotcs, broad spectrum -unasyn 1.5 gr,   -monitoring lab results such as cbc, proca  -if process worsens- MRI or CT imaging to rule out abscess or sharon angina    3. Diabetes mellitus  -resume home meds- metformin 500 MG  -dIET  -Monitoring glucose level, HbA1c, and will re-adjust med.s    4. Essential hypertension   -resume home meds. nORVASC 10 MG  -monitoring BP and considering add metoprolol if needed  5. Obesity  -BMI 59  6. Hx of brain aneurism  -2 surgeries performed, since no seizures  7. Hx of seizures  -2 surgeries brain aneurism performed, since no seizures  -fall precautions, monitoring  8. Hx of CAD ? -patient stated that had chesp pain yrs ago, was given nitroglycerine as needed, but never had chest pain or tightness since then  -given nitroglycerin for chest pain yrs ago as needed  -ECG ordered, will monitor    Chief Complaint:  Facial edema    History Of Present Illness:  Pasha Merida  61 y.o. female. PMHX of bran aneurism, HTN, Diabetes, seizures, Obesity, who presented to [place] [address] with facial edema. The patient felt swollen lips yesterday morning and visited [facility] ED.  She was sent home after evaluation and given benadryl,steroid, and hold off lisinopril. This morning, the patient noticed of worsening edema. She called to her PCP and was advised to visit hospital again, and she came hospital. She denies dyspnea, SOB, chest pain, throat pain, fever, chills, increasing saliva production, drooling, difficulty of swallowing, chest pain. Also she denies any new home chemical/household products, cosmetic products such as cream lotion lipstick. In ED, patient was evaluated, benadryl 25mg, methylprednisolone 125mg annd FFP 2 pack are given. The patient did not improve. The patient is admitted to the hospital for further evaluation and treatment    Past Medical History:          Diagnosis Date    Anxiety     [facility] ([facility] [address]     X2; benign    Diabetes mellitus ([facility] [address]     Obesity     Seizures ([facility] [address]        Past Surgical History:          Procedure Laterality Date    APPENDECTOMY      BRAIN TUMOR EXCISION      x 2     SECTION      x3       Medications Prior to Admission:      Prior to Admission medications    Medication Sig Start Date End Date Taking? Authorizing Provider   isosorbide mononitrate (IMDUR) 30 MG extended release tablet Take 30 mg by mouth daily   Yes Historical Provider, MD   amLODIPine (NORVASC) 10 MG tablet Take 10 mg by mouth daily   Yes Historical Provider, MD   aspirin 81 MG EC tablet Take 81 mg by mouth daily   Yes Historical Provider, MD   furosemide (LASIX) 40 MG tablet Take 40 mg by mouth daily   Yes Historical Provider, MD   predniSONE (DELTASONE) 10 MG tablet Take 4 tablets by mouth once daily for 5 days 8/24/21 9/3/21 Yes CORRINA Toney   atorvastatin (LIPITOR) 10 MG tablet Take 10 mg by mouth daily. Yes Historical Provider, MD   metformin (GLUCOPHAGE) 500 MG tablet Take 500 mg by mouth daily. Yes Historical Provider, MD       Allergies:  Patient has no known allergies. Social History:      The patient currently lives     TOBACCO:   reports that she has never smoked.  She has never used smokeless tobacco.  ETOH:   reports current alcohol use. Family History:       Reviewed in detail and negative for DM, CAD, Cancer, CVA. Positive as follows:        Problem Relation Age of Onset    Cancer Mother         kidney    Stroke Father     Cancer Sister         kidney    Breast Cancer Sister 48       Diet:  ADULT DIET; Regular; 3 carb choices (45 gm/meal)    REVIEW OF SYSTEMS:   All 13 review of symptoms are negative except as listed above in the HPI. PHYSICAL EXAM:    BP (!) 151/81   Pulse 90   Temp 97.4 °F (36.3 °C)   Resp 16   Ht 5' 2.99\" (1.6 m)   Wt (!) 333 lb 12.8 oz (151.4 kg)   SpO2 100%   BMI 59.15 kg/m²     General appearance: In mild distress due to edema, appears stated age, obese-looking and cooperative. HEENT:  Normal cephalic, atraumatic without obvious deformity. On left upper frontal area there is a post-surgical scar- well healed- old., Pupils equal, round, and reactive to light. Extra ocular muscles intact. Conjunctivae/corneas clear. Face: Lower lip is markedly swelling, but no pain is note on palpation. Submandibular region is slightly swollen- mild painful on palpation, on left side enlarged slightly painful mobile lymph node noted. Mouth - no local lesions noted on palpation. Teeth are absent, floor of mouth is slightly painful on palpation. No lesions noted  Neck: Supple, with full range of motion. No jugular venous distention. Trachea midline. No enlarged lymph nodes noted  Respiratory:  Normal respiratory effort. Clear to auscultation, bilaterally without Rales/Wheezes/Rhonchi. Cardiovascular:  Regular rate and rhythm with normal S1/S2 without murmurs, rubs or gallops. +1 pitting edema on lower ext.s bilaterally  Abdomen: Soft, excess fat tissue-panniculus, non-tender,not-distended with normal bowel sounds. Musculoskeletal:  No clubbing, cyanosis or edema bilaterally. Full range of motion without deformity.   Skin: Skin color, texture, turgor normal.  No rashes or lesions. Neurologic:  Neurovascularly intact without any focal sensory/motor deficits. Cranial nerves: II-XII intact, grossly non-focal.  Psychiatric:  Alert and oriented, thought content appropriate, normal insight  Capillary Refill: Brisk,< 3 seconds   Peripheral Pulses: +2 palpable, equal bilaterally       Labs:     Recent Labs     08/25/21  1120   WBC 4.6*   HGB 12.3   HCT 38.1        Recent Labs     08/25/21  1120      K 4.3      CO2 21*   BUN 14   CREATININE 0.9   CALCIUM 9.8     Recent Labs     08/25/21  1120   AST 21   ALT 15   BILITOT 0.5   ALKPHOS 93     No results for input(s): INR in the last 72 hours. No results for input(s): Bridger Seed in the last 72 hours. Urinalysis:    No results found for: NITRU, WBCUA, BACTERIA, RBCUA, BLOODU, SPECGRAV, GLUCOSEU    Intake & Output:  No intake/output data recorded. I/O this shift:  In: 202 [Blood:202]  Out: -       Radiology:     CXR: I have reviewed the CXR with the following interpretation:   EKG:  I have reviewed the EKG with the following interpretation:     No orders to display        DVT prophylaxis: {dvt prophylaxis:27589    Code Status: Full Code      PT/OT Eval Status:       Active Hospital Problems    Diagnosis Date Noted    Angioedema of lips [T78. 3XXA] 08/25/2021             Thank you YONY Norris - MYNOR for the opportunity to be involved in this patient's care.     Electronically signed by Marylin Cranker, DO on 8/25/2021 at 5:36 PM

## 2021-08-25 NOTE — ED NOTES
Pt transported to [facility] on cart in stable condition. Floor contacted before transport. Spoke with Rachel.      Corine Juncos  08/25/21 6826

## 2021-08-25 NOTE — ED NOTES
Pt lung sounds remain clear, respirations are equal and unlabored. Pt denies chest pain or SOB.  Lung sounds remain clear     Martha Saez RN  08/25/21 6444

## 2021-08-25 NOTE — ED PROVIDER NOTES
eMERGENCY dEPARTMENT eNCOUnter      [address] [facility]    Chief Complaint   Patient presents with    Facial Swelling       HPI    Cathryn Miranda is a 61 y.o. female who presents to the emergency department because of worsening facial swelling. Patient was in the emergency department yesterday because the same symptoms. I compared patients today swelling with her photographs from yesterday is definitely worse. Patient was given steroids, Pepcid and Benadryl in the emergency department and was discharged on that. Patient does not have any issues with her breathing. Is still able to swallow. But has hard time opening her mouth today. There is no nausea or vomiting. Patient was on 40 mg of lisinopril which was stopped yesterday. PAST MEDICAL HISTORY    Past Medical History:   Diagnosis Date    Anxiety     Brain tumor ([facility] [address]     X2; benign    Diabetes mellitus ([facility] [address]     Obesity     Seizures ([facility] [address]        SURGICAL HISTORY    Past Surgical History:   Procedure Laterality Date    APPENDECTOMY      BRAIN TUMOR EXCISION      x 2     SECTION      x3       CURRENT MEDICATIONS    Current Outpatient Rx   Medication Sig Dispense Refill    predniSONE (DELTASONE) 10 MG tablet Take 4 tablets by mouth once daily for 5 days 20 tablet 0    hydrOXYzine (ATARAX) 50 MG tablet Take 1 tablet by mouth 3 times daily as needed for Itching 30 tablet 0    famotidine (PEPCID) 20 MG tablet Take 1 tablet by mouth 2 times daily 60 tablet 3    atorvastatin (LIPITOR) 10 MG tablet Take 10 mg by mouth daily.  HYDROcodone-acetaminophen (NORCO) 5-325 MG per tablet Take 1 tablet by mouth every 4 hours as needed for Pain. 20 tablet 0    metformin (GLUCOPHAGE) 500 MG tablet Take 500 mg by mouth daily.  triamcinolone (KENALOG) 0.1 % cream Apply topically 2 times daily.  45 g 0       ALLERGIES    No Known Allergies    FAMILY HISTORY    Family History   Problem Relation Age of Onset    Cancer Mother         kidney    Stroke Father     Cancer Sister         kidney    Breast Cancer Sister 48       SOCIAL HISTORY    Social History     Socioeconomic History    Marital status:      Spouse name: None    Number of children: None    Years of education: None    Highest education level: None   Occupational History    None   Tobacco Use    Smoking status: Never Smoker    Smokeless tobacco: Never Used   Vaping Use    Vaping Use: Never used   Substance and Sexual Activity    Alcohol use: Yes     Comment: occasion    Drug use: No    Sexual activity: None   Other Topics Concern    None   Social History Narrative    None     Social Determinants of Health     Financial Resource Strain:     Difficulty of Paying Living Expenses:    Food Insecurity:     Worried About Running Out of Food in the Last Year:     Ran Out of Food in the Last Year:    Transportation Needs:     Lack of Transportation (Medical):      Lack of Transportation (Non-Medical):    Physical Activity:     Days of Exercise per Week:     Minutes of Exercise per Session:    Stress:     Feeling of Stress :    Social Connections:     Frequency of Communication with Friends and Family:     Frequency of Social Gatherings with Friends and Family:     Attends [employer] Services:     Active Member of Clubs or Organizations:     Attends Club or Organization Meetings:     Marital Status:    Intimate Partner Violence:     Fear of Current or Ex-Partner:     Emotionally Abused:     Physically Abused:     Sexually Abused:        REVIEW OF SYSTEMS    Constitutional:  Denies fever, chills, weight loss or weakness   Eyes:  Denies photophobia or discharge   HENT:  Denies sore throat or ear pain   Respiratory:  Denies cough or shortness of breath   Cardiovascular:  Denies chest pain, palpitations or swelling   GI:  Denies abdominal pain, nausea, vomiting, or diarrhea   Musculoskeletal:  Denies back pain   Skin:  Denies rash   Neurologic:  Denies headache, focal weakness or sensory changes   Endocrine:  Denies polyuria or polydypsia   Lymphatic:  Denies swollen glands   Psychiatric:  Denies depression, suicidal ideation or homicidal ideation   All systems negative except as marked. PHYSICAL EXAM    VITAL SIGNS: /87   Pulse 89   Temp 98.7 °F (37.1 °C) (Oral)   Resp 18   Ht 5' 3\" (1.6 m)   Wt (!) 373 lb (169.2 kg)   SpO2 98%   BMI 66.07 kg/m²    Constitutional:  Well developed, Well nourished, No acute distress, Non-toxic appearance. HENT:  Normocephalic, Atraumatic, Bilateral external ears normal, Oropharynx moist, No oral exudates, Nose normal. Neck- Normal range of motion, No tenderness, Supple, No stridor. Patient has lot of lip swelling and some tongue swelling  Eyes:  PERRL, EOMI, Conjunctiva normal, No discharge. Respiratory:  Normal breath sounds, No respiratory distress, No wheezing, No chest tenderness. Cardiovascular:  Normal heart rate, Normal rhythm, No murmurs, No rubs, No gallops. GI:  Bowel sounds normal, Soft, No tenderness, No masses, No pulsatile masses. : External genitalia appear normal, No masses or lesions. No discharge. No CVA tenderness. Musculoskeletal:  Intact distal pulses, No edema, No tenderness, No cyanosis, No clubbing. Good range of motion in all major joints. No tenderness to palpation or major deformities noted. Back- No tenderness. Integument:  Warm, Dry, No erythema, No rash. Lymphatic:  No lymphadenopathy noted. Neurologic:  Alert & oriented x 3, Normal motor function, Normal sensory function, No focal deficits noted.    Psychiatric:  Affect normal, Judgment normal, Mood normal.     LABS  Labs Reviewed   CBC WITH AUTO DIFFERENTIAL - Abnormal; Notable for the following components:       Result Value    WBC 4.6 (*)     RDW-CV 16.9 (*)     RDW-SD 51.8 (*)     Monocytes Absolute 0.3 (*)     All other components within normal limits   COMPREHENSIVE METABOLIC PANEL W/ REFLEX TO MG FOR LOW K - Abnormal; Notable for the following components:    CO2 21 (*)     Total Protein 8.3 (*)     All other components within normal limits   GLOMERULAR FILTRATION RATE, ESTIMATED - Abnormal; Notable for the following components:    Est, Glom Filt Rate 76 (*)     All other components within normal limits   ANION GAP   OSMOLALITY   TYPE AND SCREEN   PREPARE FRESH FROZEN PLASMA    Narrative:     [id number]     selected         CONSULTS  Dr. Ivis Yi    No orders to display       CRITICAL CARE  Due to the immediate potential for life-threatening deterioration due to angioneurotic edema, I spent 45 minutes providing critical care. This time is excluding time spent performing procedures. ED COURSE & MEDICAL DECISION MAKING    Pertinent Labs & Imaging studies reviewed. (See chart for details)  Patient swelling has been worse since yesterday. Patient was started on prednisone, Benadryl and Pepcid without any relief. 2 units of FFP was ordered. Patient will be admitted by Dr. Randy Smith from hospitalist group for further work-up and management. FINAL IMPRESSION    1.  Angioedema, initial encounter             Abbe Salas MD  08/25/21 5543

## 2021-08-25 NOTE — ED NOTES
FFP started at this time and reached pt.  This RN with patient at this time     [facility]  08/25/21 5011

## 2021-08-25 NOTE — ED NOTES
First 15 minute window for FFP completed at this time, pt denies chest pain, sob or back pain. Respirations are equal and unlabored.  Lung sounds remain clear, pt has no obvious signs of distress at this time     Everette Kelley RN  08/25/21 1985

## 2021-08-26 VITALS
HEIGHT: 63 IN | WEIGHT: 293 LBS | BODY MASS INDEX: 51.91 KG/M2 | HEART RATE: 93 BPM | TEMPERATURE: 97.5 F | RESPIRATION RATE: 18 BRPM | OXYGEN SATURATION: 98 % | DIASTOLIC BLOOD PRESSURE: 62 MMHG | SYSTOLIC BLOOD PRESSURE: 112 MMHG

## 2021-08-26 LAB
ANION GAP SERPL CALCULATED.3IONS-SCNC: 12 MEQ/L (ref 8–16)
BUN BLDV-MCNC: 16 MG/DL (ref 7–22)
CALCIUM SERPL-MCNC: 10.1 MG/DL (ref 8.5–10.5)
CHLORIDE BLD-SCNC: 106 MEQ/L (ref 98–111)
CO2: 22 MEQ/L (ref 23–33)
CREAT SERPL-MCNC: 1 MG/DL (ref 0.4–1.2)
ERYTHROCYTE [DISTWIDTH] IN BLOOD BY AUTOMATED COUNT: 16.7 % (ref 11.5–14.5)
ERYTHROCYTE [DISTWIDTH] IN BLOOD BY AUTOMATED COUNT: 53.2 FL (ref 35–45)
GFR SERPL CREATININE-BSD FRML MDRD: 67 ML/MIN/1.73M2
GLUCOSE BLD-MCNC: 110 MG/DL (ref 70–108)
GLUCOSE BLD-MCNC: 116 MG/DL (ref 70–108)
GLUCOSE BLD-MCNC: 137 MG/DL (ref 70–108)
HCT VFR BLD CALC: 39 % (ref 37–47)
HEMOGLOBIN: 11.8 GM/DL (ref 12–16)
MCH RBC QN AUTO: 26.5 PG (ref 26–33)
MCHC RBC AUTO-ENTMCNC: 30.3 GM/DL (ref 32.2–35.5)
MCV RBC AUTO: 87.4 FL (ref 81–99)
PLATELET # BLD: 277 THOU/MM3 (ref 130–400)
PMV BLD AUTO: 9.9 FL (ref 9.4–12.4)
POTASSIUM SERPL-SCNC: 4.1 MEQ/L (ref 3.5–5.2)
RBC # BLD: 4.46 MILL/MM3 (ref 4.2–5.4)
SODIUM BLD-SCNC: 140 MEQ/L (ref 135–145)
WBC # BLD: 4.7 THOU/MM3 (ref 4.8–10.8)

## 2021-08-26 PROCEDURE — 82948 REAGENT STRIP/BLOOD GLUCOSE: CPT

## 2021-08-26 PROCEDURE — 6370000000 HC RX 637 (ALT 250 FOR IP)

## 2021-08-26 PROCEDURE — 6360000002 HC RX W HCPCS

## 2021-08-26 PROCEDURE — 2580000003 HC RX 258

## 2021-08-26 PROCEDURE — 80048 BASIC METABOLIC PNL TOTAL CA: CPT

## 2021-08-26 PROCEDURE — G0378 HOSPITAL OBSERVATION PER HR: HCPCS

## 2021-08-26 PROCEDURE — 99217 PR OBSERVATION CARE DISCHARGE MANAGEMENT: CPT | Performed by: INTERNAL MEDICINE

## 2021-08-26 PROCEDURE — 96366 THER/PROPH/DIAG IV INF ADDON: CPT

## 2021-08-26 PROCEDURE — 36415 COLL VENOUS BLD VENIPUNCTURE: CPT

## 2021-08-26 PROCEDURE — 85027 COMPLETE CBC AUTOMATED: CPT

## 2021-08-26 PROCEDURE — 96376 TX/PRO/DX INJ SAME DRUG ADON: CPT

## 2021-08-26 PROCEDURE — 96372 THER/PROPH/DIAG INJ SC/IM: CPT

## 2021-08-26 RX ORDER — CETIRIZINE HYDROCHLORIDE 10 MG/1
10 TABLET ORAL 2 TIMES DAILY
Qty: 10 TABLET | Refills: 0 | Status: SHIPPED | OUTPATIENT
Start: 2021-08-26 | End: 2021-08-31

## 2021-08-26 RX ADMIN — ISOSORBIDE MONONITRATE 30 MG: 30 TABLET ORAL at 08:28

## 2021-08-26 RX ADMIN — ACETAMINOPHEN 650 MG: 325 TABLET ORAL at 08:33

## 2021-08-26 RX ADMIN — ASPIRIN 81 MG: 81 TABLET, COATED ORAL at 08:28

## 2021-08-26 RX ADMIN — AMLODIPINE BESYLATE 10 MG: 10 TABLET ORAL at 08:28

## 2021-08-26 RX ADMIN — SODIUM CHLORIDE 1500 MG: 900 INJECTION INTRAVENOUS at 01:18

## 2021-08-26 RX ADMIN — METFORMIN HYDROCHLORIDE 500 MG: 500 TABLET ORAL at 08:28

## 2021-08-26 RX ADMIN — SODIUM CHLORIDE 1500 MG: 900 INJECTION INTRAVENOUS at 06:20

## 2021-08-26 RX ADMIN — POLYETHYLENE GLYCOL 3350 17 G: 17 POWDER, FOR SOLUTION ORAL at 08:27

## 2021-08-26 RX ADMIN — DIPHENHYDRAMINE HYDROCHLORIDE 25 MG: 50 INJECTION, SOLUTION INTRAMUSCULAR; INTRAVENOUS at 06:21

## 2021-08-26 RX ADMIN — DIPHENHYDRAMINE HYDROCHLORIDE 25 MG: 50 INJECTION, SOLUTION INTRAMUSCULAR; INTRAVENOUS at 01:18

## 2021-08-26 RX ADMIN — DIPHENHYDRAMINE HYDROCHLORIDE 25 MG: 50 INJECTION, SOLUTION INTRAMUSCULAR; INTRAVENOUS at 13:21

## 2021-08-26 RX ADMIN — ENOXAPARIN SODIUM 40 MG: 40 INJECTION SUBCUTANEOUS at 08:28

## 2021-08-26 RX ADMIN — METHYLPREDNISOLONE SODIUM SUCCINATE 40 MG: 40 INJECTION, POWDER, FOR SOLUTION INTRAMUSCULAR; INTRAVENOUS at 08:28

## 2021-08-26 RX ADMIN — SODIUM CHLORIDE, PRESERVATIVE FREE 10 ML: 5 INJECTION INTRAVENOUS at 08:33

## 2021-08-26 RX ADMIN — FUROSEMIDE 40 MG: 40 TABLET ORAL at 08:28

## 2021-08-26 ASSESSMENT — PAIN SCALES - GENERAL
PAINLEVEL_OUTOF10: 2
PAINLEVEL_OUTOF10: 0
PAINLEVEL_OUTOF10: 3
PAINLEVEL_OUTOF10: 0

## 2021-08-26 NOTE — CARE COORDINATION
8/26/21, 10:04 AM EDT  DISCHARGE PLANNING EVALUATION:    Chloé Blanco       Admitted: 8/25/2021/ [address] day: 0   Location: -21/021-A Reason for admit: Angioedema of lips, subsequent encounter Huong Slot. 3XXD]  Angioedema, initial encounter [T78. 3XXA]   PMH:  has a past medical history of Anxiety, Brain tumor ([facility] [address] Diabetes mellitus ([facility] [address] Obesity, and Seizures ([facility] [address] Procedure: N/A  Barriers to Discharge:  Patient presented with facial swelling. She was transfused with 2 units of FFP. Unasyn and IV Beneadryl q 6 hr., IV Pepcid x 1 dose, Solumedrol x 2 doses, prn Tylenol and Zofran, carb choice diet, up with assistance. PCP: Parminder Romano, APRN - CNP   %    Patient Goals/Plan/Treatment Preferences: Met with Chelo Shawnee. She resides at home alone. Chelo Mallory states she is able to afford her medications. She does not have a need for DME or HH. Her son will drive her home at discharge. Chelo Mallory would like to have her medications filled prior to her discharge to home. Med's to Bed selected. Chelo Mallory denies any further needs. Discharge planned for today. Transportation/Food Security/Housekeeping Addressed:  No issues identified. 8/26/21, 10:20 AM EDT    Patient goals/plan/ treatment preferences discussed by  and . Patient goals/plan/ treatment preferences reviewed with patient/ family. Patient/ family verbalize understanding of discharge plan and are in agreement with goal/plan/treatment preferences. Understanding was demonstrated using the teach back method. AVS provided by RN at time of discharge, which includes all necessary medical information pertaining to the patients current course of illness, treatment, post-discharge goals of care, and treatment preferences.

## 2021-08-26 NOTE — PROGRESS NOTES
CLINICAL PHARMACY NOTE: MEDS TO BEDS    Total # of Prescriptions Filled: 1   The following medications were delivered to the patient:  Cetirizine 10mg    Additional Documentation:

## 2021-08-26 NOTE — PROGRESS NOTES
PLAN:  1. Angioedema of lips - most likely medication induced- lisinopril.  -Hx of lisinopril use, sudden onset, localized edema- mostly lower lips  -hold possible cause of angioedema- lisinopril  -benadryl 25 mg IV every 6 hours  -monitoring lab results such as cbc, procal    8/26/2021: Tongue and lip edema significantly improved during hospitalization, procal from yesterday WNL. Discharging today, will transition from benadryl to cetirizine 10 mg bid.      2. Plegmon of submandibular regions?-less likely. -denture use- possible cause of trauma  -local edema, swollen submandibular lymph nodes on left side  -antibiotcs, broad spectrum -unasyn 1.5 gr,   -monitoring lab results such as cbc, proca  -if process worsens- MRI or CT imaging to rule out abscess or sharon angina    8/26/2021: Morning labs reassuring, WBC count stable at 4.7. No need for imaging at this time with clinical improvement.      3. Diabetes mellitus  -resume home meds- metformin 500 MG  -dIET  -Monitoring glucose level, HbA1c, and will re-adjust med.    8/26/2021: Hb A1c pending, glucose is 116. Continue metformin 500 mg.      4. Essential hypertension   -resume home meds. nORVASC 10 MG, lasix 40 mg daily  -monitoring BP and considering add metoprolol if needed, holding lisinopril due to angioedema. 8/26/2021: BP is 128/74 this morning. Continue current medication regimen. 5. Obesity  -BMI 59  6. Hx of brain aneurism  -2 surgeries performed, since no seizures  7. Hx of seizures  -2 surgeries brain aneurism performed, since no seizures  -fall precautions, monitoring  8. Hx of CAD ? -patient stated that had chesp pain yrs ago, was given nitroglycerine as needed, but never had chest pain or tightness since then  -given nitroglycerin for chest pain yrs ago as needed  -ECG ordered, will monitor    8/26/2021: On atorvastatin 10 mg and ASA 81 mg, possible left atrial enlargement on EKG, no significant changes from prior EKG from September 2017. Denies chest pain, dyspnea, palpitations. Hospitalization:   61 y.o. female. PMHX of brain aneuryism, HTN, Diabetes, seizures, Obesity, who presented to [address] [place] [address] with facial edema. The patient felt her lips were swollen on 8/24/2021 and presented to [facility] ED. She was sent home after evaluation and discharged with Atarax, pepcid, and prednisone. Yesterday morning, the patient noticed worsening edema. She called to her PCP and was advised to visit hospital again, and she came returned to [facility] ED. She denies dyspnea, SOB, chest pain, throat pain, fever, chills, increasing saliva production, drooling, difficulty of swallowing, chest pain. Also she denies any new home chemical/household products, cosmetic products such as cream lotion lipstick. In ED, patient was evaluated, benadryl 25mg, methylprednisolone 125mg and FFP 2 pack are given. The patient did not improve. The patient is admitted to the hospital for further evaluation and treatment. 8/26/2021: AVSS. Lip and tongue swelling significantly improved, no alarm sx at this time. Anticipating discharge today. Subjective (past 24 hours):  Patient feeling much better today, swelling has significantly improved compared with photos from previous 2 days. She states that she has been on lisinopril for years, no prior hx of similar symptoms. Denies any recent dental work, family hx of angioedema, recent sick contacts. She notes she did have a dry cough last week, but denies any associated infx sx at that time. Denies fever, chills, rhinorrhea, sore throat, dysphagia, chest pain, dyspnea, palpitations, or dysuria. Slept well overnight, no bowel movement since Tuesday. General appearance: In no apparent distress, appears stated age, obese-looking and cooperative. HEENT:  Normal cephalic, atraumatic without obvious deformity.  On left upper frontal area there is a post-surgical scar- well healed- old., Pupils equal, round, and reactive to

## 2021-08-26 NOTE — DISCHARGE SUMMARY
Hospital Medicine Discharge Summary      Patient Identification:   Christine Sanabria   : 1955  MRN: [id number]   Account: [de-identified]      Patient's PCP: Campbell Abdalla. Lizabeth Benedict, YONY - CNP    Admit Date: 2021     Discharge Date: 2021      Admitting Physician: Dillan Pickens DO     Discharge Physician: Alli Ortega DO     Discharge Diagnoses:  Angioedema of lips - most likely medication induced- lisinopril.   Diabetes mellitus  Essential hypertension   Morbid Obesity  Hx of brain aneurism  Hx of seizures  Hx of CAD    The patient was seen and examined on day of discharge and this discharge summary is in conjunction with any daily progress note from day of discharge. Hospital Course:   Christine Sanabria is a 61 y. o. female. PMHX of brain aneuryism, HTN, Diabetes, seizures, Obesity, who presented to [facility] with facial edema. The patient felt her lips were swollen on 2021 and presented to [facility] ED. She was sent home after evaluation and discharged with Atarax, pepcid, and prednisone.      21 In morning, the patient noticed worsening edema. She called to her PCP and was advised to visit hospital again, and she came returned to [facility] ED. She denies dyspnea, SOB, chest pain, throat pain, fever, chills, increasing saliva production, drooling, difficulty of swallowing, chest pain. Also she denies any new home chemical/household products, cosmetic products such as cream lotion lipstick. In ED, patient was evaluated, benadryl 25mg, methylprednisolone 125mg and FFP 2 pack are given. The patient is admitted to the hospital for further evaluation and treatment. The patient examined, found tenderness/mild edema on submandibular region, no lesions in mouth and no edema in oropharyngeal region, no hypersalivation. , no swallowing difficulty found. On labworkup, normal wbc count, normal procal level.  ECG borderline normal.  Suspected medication for angioedema - Lisinopril stopped. The patient is treated with antihistamine- Benadryl and steroid- solumedrol  Medications started, for possible inflammation process of submandibular region started unasyn - kathy spectrum antibiotic.  8/26/2021:The patient reported markedly improvement. Lipswelling significantly improved, no tenderness found in submandibular region. Repeat lab workups - wbc - 4.7. normal bmp, POC glu levels were 110-120 range. The patient is being discharged, stable. She is recommended to follow up PCP  For further  Management and hypertension meds review, She is prescribed Zyrtec 10mg bid for 5 days. Exam:     Vitals:  Vitals:    08/26/21 0115 08/26/21 0430 08/26/21 0819 08/26/21 1141   BP: (!) 141/71 118/71 128/74 112/62   Pulse: 84 74 83 93   Resp: 16 16 18 18   Temp: 97.9 °F (36.6 °C) 98 °F (36.7 °C) 97.6 °F (36.4 °C) 97.5 °F (36.4 °C)   TempSrc: Oral Oral Oral Oral   SpO2: 96% 95% 99% 98%   Weight:       Height:         Weight: Weight: (!) 333 lb 12.8 oz (151.4 kg)     24 hour intake/output:    Intake/Output Summary (Last 24 hours) at 8/26/2021 1538  Last data filed at 8/25/2021 1546  Gross per 24 hour   Intake 202 ml   Output --   Net 202 ml       General appearance: In mild distress due to edema, appears stated age, obese-looking and cooperative. HEENT:  Normal cephalic, atraumatic without obvious deformity. On left upper frontal area there is a post-surgical scar- well healed- old., Pupils equal, round, and reactive to light. Extra ocular muscles intact. Conjunctivae/corneas clear. Face: Lower lip edema  is markedly improved compare to yesterday. No pain is note on palpation. Submandibular region is not swollen, no more painful on palpation. Mouth - no local lesions noted on palpation. Teeth are absent, floor of mouth is slightly painful on palpation. No lesions noted  Neck: Supple, with full range of motion. No jugular venous distention. Trachea midline.  No enlarged lymph nodes noted  Respiratory:  Normal on: 08/26/21 1538   Medication Information                      amLODIPine (NORVASC) 10 MG tablet  Take 10 mg by mouth daily             aspirin 81 MG EC tablet  Take 81 mg by mouth daily             atorvastatin (LIPITOR) 10 MG tablet  Take 10 mg by mouth daily. cetirizine (ZYRTEC) 10 MG tablet  Take 1 tablet by mouth 2 times daily for 5 days             furosemide (LASIX) 40 MG tablet  Take 40 mg by mouth daily             isosorbide mononitrate (IMDUR) 30 MG extended release tablet  Take 30 mg by mouth daily             metformin (GLUCOPHAGE) 500 MG tablet  Take 500 mg by mouth daily. predniSONE (DELTASONE) 10 MG tablet  Take 4 tablets by mouth once daily for 5 days                 Time Spent on discharge is more than 30 minutes in the examination, evaluation, counseling and review of medications and discharge plan. Signed: Thank you YONY Chavez - MYNOR for the opportunity to be involved in this patient's care.     Electronically signed by Lokesh Lugo DO on 8/26/2021 at 3:38 PM

## 2021-08-26 NOTE — PLAN OF CARE
Problem: Skin Integrity:  Goal: Absence of new skin breakdown  Description: Absence of new skin breakdown  Outcome: Ongoing  Note: No new skin issues noted this shift. Problem: Falls - Risk of:  Goal: Will remain free from falls  Description: Will remain free from falls  Outcome: Ongoing  Note: No falls sustained at this time. Patient alert to call light and uses appropriately to alert staff to needs. Bed/chair alarms in use. Problem: Bleeding:  Goal: Will show no signs and symptoms of excessive bleeding  Description: Will show no signs and symptoms of excessive bleeding  Outcome: Ongoing  Note: No s/s excessive bleeding noted. On lovenox and aspirin      Problem: Discharge Planning:  Goal: Discharged to appropriate level of care  Description: Discharged to appropriate level of care  Outcome: Ongoing  Note: Plans for discharge sometime today since stable.  Waiting for physician to complete AVS

## 2021-08-26 NOTE — PROGRESS NOTES
AVS provided by RN at time of discharge, which includes all necessary medical information pertaining to the patients current course of illness, treatment, medications, post-discharge goals of care, and treatment preferences. Patient/ family verbalize understanding of discharge plan and are in agreement with goal/plan/treatment preferences. Belongings including Glasses, cell phone and  sent with patient. Home medications sent home with patient- Meds to beds      Availability of \"My Chart\" offered to patient as a tool for updated health record.   Steps for activation discussed with patient as mentioned on AVS.

## 2022-10-27 ENCOUNTER — HOSPITAL ENCOUNTER (OUTPATIENT)
Dept: WOMENS IMAGING | Age: 67
Discharge: HOME | End: 2022-10-27
Payer: COMMERCIAL

## 2022-10-27 DIAGNOSIS — Z12.31 VISIT FOR SCREENING MAMMOGRAM: ICD-10-CM

## 2022-10-27 PROCEDURE — 77063 BREAST TOMOSYNTHESIS BI: CPT
